# Patient Record
Sex: FEMALE | Race: WHITE
[De-identification: names, ages, dates, MRNs, and addresses within clinical notes are randomized per-mention and may not be internally consistent; named-entity substitution may affect disease eponyms.]

---

## 2018-10-08 ENCOUNTER — HOSPITAL ENCOUNTER (EMERGENCY)
Dept: HOSPITAL 83 - ED | Age: 38
Discharge: HOME | End: 2018-10-08
Payer: COMMERCIAL

## 2018-10-08 VITALS — DIASTOLIC BLOOD PRESSURE: 65 MMHG

## 2018-10-08 VITALS — WEIGHT: 224 LBS | HEIGHT: 67.99 IN | BODY MASS INDEX: 33.95 KG/M2

## 2018-10-08 DIAGNOSIS — Z88.8: ICD-10-CM

## 2018-10-08 DIAGNOSIS — Z79.899: ICD-10-CM

## 2018-10-08 DIAGNOSIS — M25.572: ICD-10-CM

## 2018-10-08 DIAGNOSIS — Z88.1: ICD-10-CM

## 2018-10-08 DIAGNOSIS — M79.662: ICD-10-CM

## 2018-10-08 DIAGNOSIS — Z88.2: ICD-10-CM

## 2018-10-08 DIAGNOSIS — Z88.6: ICD-10-CM

## 2018-10-08 DIAGNOSIS — R60.0: Primary | ICD-10-CM

## 2018-10-08 DIAGNOSIS — Z79.82: ICD-10-CM

## 2018-10-08 DIAGNOSIS — F17.200: ICD-10-CM

## 2018-10-08 DIAGNOSIS — Z79.84: ICD-10-CM

## 2018-10-08 DIAGNOSIS — Z98.51: ICD-10-CM

## 2018-12-14 ENCOUNTER — HOSPITAL ENCOUNTER (EMERGENCY)
Dept: HOSPITAL 83 - ED | Age: 38
Discharge: HOME | End: 2018-12-14
Payer: COMMERCIAL

## 2018-12-14 VITALS — WEIGHT: 218 LBS | BODY MASS INDEX: 33.04 KG/M2 | HEIGHT: 67.99 IN

## 2018-12-14 VITALS — DIASTOLIC BLOOD PRESSURE: 54 MMHG

## 2018-12-14 DIAGNOSIS — Z88.2: ICD-10-CM

## 2018-12-14 DIAGNOSIS — Z88.6: ICD-10-CM

## 2018-12-14 DIAGNOSIS — E27.8: Primary | ICD-10-CM

## 2018-12-14 DIAGNOSIS — G43.909: ICD-10-CM

## 2018-12-14 DIAGNOSIS — Z90.710: ICD-10-CM

## 2018-12-14 DIAGNOSIS — Z79.899: ICD-10-CM

## 2018-12-14 DIAGNOSIS — Z79.84: ICD-10-CM

## 2018-12-14 DIAGNOSIS — Z88.1: ICD-10-CM

## 2018-12-14 DIAGNOSIS — J44.9: ICD-10-CM

## 2018-12-14 DIAGNOSIS — R03.0: ICD-10-CM

## 2018-12-14 LAB
ALBUMIN SERPL-MCNC: 3.5 GM/DL (ref 3.1–4.5)
ALP SERPL-CCNC: 92 U/L (ref 45–117)
ALT SERPL W P-5'-P-CCNC: 20 U/L (ref 12–78)
APPEARANCE UR: (no result)
AST SERPL-CCNC: 21 IU/L (ref 3–35)
BACTERIA #/AREA URNS HPF: (no result) /[HPF]
BASOPHILS # BLD AUTO: 0.1 10*3/UL (ref 0–0.1)
BASOPHILS NFR BLD AUTO: 0.7 % (ref 0–1)
BILIRUB UR QL STRIP: NEGATIVE
BUN SERPL-MCNC: 11 MG/DL (ref 7–24)
CHLORIDE SERPL-SCNC: 102 MMOL/L (ref 98–107)
COLOR UR: YELLOW
CREAT SERPL-MCNC: 1.14 MG/DL (ref 0.55–1.02)
EOSINOPHIL # BLD AUTO: 0.3 10*3/UL (ref 0–0.4)
EOSINOPHIL # BLD AUTO: 2.4 % (ref 1–4)
EPI CELLS #/AREA URNS HPF: (no result) /[HPF]
ERYTHROCYTE [DISTWIDTH] IN BLOOD BY AUTOMATED COUNT: 14.1 % (ref 0–14.5)
GLUCOSE UR QL: NEGATIVE
HCT VFR BLD AUTO: 46.4 % (ref 37–47)
HGB BLD-MCNC: 15.2 G/DL (ref 12–16)
HGB UR QL STRIP: NEGATIVE
KETONES UR QL STRIP: NEGATIVE
LEUKOCYTE ESTERASE UR QL STRIP: (no result)
LIPASE SERPL-CCNC: 58 U/L (ref 73–393)
LYMPHOCYTES # BLD AUTO: 4.1 10*3/UL (ref 1.3–4.4)
LYMPHOCYTES NFR BLD AUTO: 33.7 % (ref 27–41)
MCH RBC QN AUTO: 29 PG (ref 27–31)
MCHC RBC AUTO-ENTMCNC: 32.8 G/DL (ref 33–37)
MCV RBC AUTO: 88.5 FL (ref 81–99)
MONOCYTES # BLD AUTO: 0.7 10*3/UL (ref 0.1–1)
MONOCYTES NFR BLD MANUAL: 5.7 % (ref 3–9)
MUCOUS THREADS URNS QL MICRO: (no result)
NEUT #: 7 10*3/UL (ref 2.3–7.9)
NEUT %: 57.3 % (ref 47–73)
NITRITE UR QL STRIP: NEGATIVE
NRBC BLD QL AUTO: 0 10*3/UL (ref 0–0)
PH UR STRIP: 6 [PH] (ref 5–9)
PLATELET # BLD AUTO: 353 10*3/UL (ref 130–400)
PMV BLD AUTO: 8.9 FL (ref 9.6–12.3)
POTASSIUM SERPL-SCNC: 4.1 MMOL/L (ref 3.5–5.1)
PROT SERPL-MCNC: 8.3 GM/DL (ref 6.4–8.2)
RBC # BLD AUTO: 5.24 10*6/UL (ref 4.1–5.1)
RBC #/AREA URNS HPF: (no result) RBC/HPF (ref 0–2)
SODIUM SERPL-SCNC: 139 MMOL/L (ref 136–145)
SP GR UR: 1.01 (ref 1–1.03)
UROBILINOGEN UR STRIP-MCNC: 0.2 E.U./DL (ref 0.2–1)
WBC #/AREA URNS HPF: (no result) WBC/HPF (ref 0–5)
WBC NRBC COR # BLD AUTO: 12.3 10*3/UL (ref 4.8–10.8)

## 2019-01-28 ENCOUNTER — HOSPITAL ENCOUNTER (EMERGENCY)
Dept: HOSPITAL 83 - ED | Age: 39
Discharge: HOME | End: 2019-01-28
Payer: COMMERCIAL

## 2019-01-28 VITALS — WEIGHT: 240 LBS | BODY MASS INDEX: 36.37 KG/M2 | HEIGHT: 67.99 IN

## 2019-01-28 VITALS — DIASTOLIC BLOOD PRESSURE: 73 MMHG

## 2019-01-28 DIAGNOSIS — R19.7: ICD-10-CM

## 2019-01-28 DIAGNOSIS — J44.1: Primary | ICD-10-CM

## 2019-01-28 DIAGNOSIS — Z79.899: ICD-10-CM

## 2019-01-28 DIAGNOSIS — Z88.2: ICD-10-CM

## 2019-01-28 DIAGNOSIS — G43.909: ICD-10-CM

## 2019-01-28 DIAGNOSIS — Z88.6: ICD-10-CM

## 2019-01-28 DIAGNOSIS — Z88.8: ICD-10-CM

## 2019-02-08 ENCOUNTER — HOSPITAL ENCOUNTER (EMERGENCY)
Dept: HOSPITAL 83 - ED | Age: 39
Discharge: ANOTHER HOSPITAL | End: 2019-02-08
Payer: COMMERCIAL

## 2019-02-08 VITALS — SYSTOLIC BLOOD PRESSURE: 144 MMHG | DIASTOLIC BLOOD PRESSURE: 57 MMHG

## 2019-02-08 DIAGNOSIS — Z79.84: ICD-10-CM

## 2019-02-08 DIAGNOSIS — G45.9: Primary | ICD-10-CM

## 2019-02-08 DIAGNOSIS — Z88.8: ICD-10-CM

## 2019-02-08 DIAGNOSIS — J44.9: ICD-10-CM

## 2019-02-08 DIAGNOSIS — G43.909: ICD-10-CM

## 2019-02-08 DIAGNOSIS — Z79.2: ICD-10-CM

## 2019-02-08 DIAGNOSIS — Z88.6: ICD-10-CM

## 2019-02-08 DIAGNOSIS — Z79.899: ICD-10-CM

## 2019-02-08 DIAGNOSIS — Z88.2: ICD-10-CM

## 2019-02-08 DIAGNOSIS — Z88.1: ICD-10-CM

## 2019-02-08 DIAGNOSIS — F17.200: ICD-10-CM

## 2019-02-08 LAB
ALBUMIN SERPL-MCNC: 3.3 GM/DL (ref 3.1–4.5)
ALP SERPL-CCNC: 83 U/L (ref 45–117)
ALT SERPL W P-5'-P-CCNC: 22 U/L (ref 12–78)
APPEARANCE UR: CLEAR
APTT PPP: 26.8 SECONDS (ref 20.8–31.5)
AST SERPL-CCNC: 12 IU/L (ref 3–35)
BACTERIA #/AREA URNS HPF: (no result) /[HPF]
BASOPHILS # BLD AUTO: 0.1 10*3/UL (ref 0–0.1)
BASOPHILS NFR BLD AUTO: 0.5 % (ref 0–1)
BILIRUB UR QL STRIP: NEGATIVE
BUN SERPL-MCNC: 10 MG/DL (ref 7–24)
CHLORIDE SERPL-SCNC: 103 MMOL/L (ref 98–107)
COLOR UR: YELLOW
CREAT SERPL-MCNC: 0.93 MG/DL (ref 0.55–1.02)
EOSINOPHIL # BLD AUTO: 0.1 10*3/UL (ref 0–0.4)
EOSINOPHIL # BLD AUTO: 1.2 % (ref 1–4)
ERYTHROCYTE [DISTWIDTH] IN BLOOD BY AUTOMATED COUNT: 14.4 % (ref 0–14.5)
GLUCOSE UR QL: NEGATIVE
HCT VFR BLD AUTO: 47.5 % (ref 37–47)
HGB BLD-MCNC: 14.9 G/DL (ref 12–16)
HGB UR QL STRIP: NEGATIVE
INR BLD: 1 (ref 2–3.5)
KETONES UR QL STRIP: NEGATIVE
LEUKOCYTE ESTERASE UR QL STRIP: NEGATIVE
LYMPHOCYTES # BLD AUTO: 2.8 10*3/UL (ref 1.3–4.4)
LYMPHOCYTES NFR BLD AUTO: 25.5 % (ref 27–41)
MCH RBC QN AUTO: 28.5 PG (ref 27–31)
MCHC RBC AUTO-ENTMCNC: 31.4 G/DL (ref 33–37)
MCV RBC AUTO: 90.8 FL (ref 81–99)
MONOCYTES # BLD AUTO: 0.7 10*3/UL (ref 0.1–1)
MONOCYTES NFR BLD MANUAL: 6.4 % (ref 3–9)
NEUT #: 7.2 10*3/UL (ref 2.3–7.9)
NEUT %: 65.9 % (ref 47–73)
NITRITE UR QL STRIP: NEGATIVE
NRBC BLD QL AUTO: 0 10*3/UL (ref 0–0)
PH UR STRIP: 6 [PH] (ref 5–9)
PLATELET # BLD AUTO: 281 10*3/UL (ref 130–400)
PMV BLD AUTO: 8.8 FL (ref 9.6–12.3)
POTASSIUM SERPL-SCNC: 3.6 MMOL/L (ref 3.5–5.1)
PROT SERPL-MCNC: 7.3 GM/DL (ref 6.4–8.2)
RBC # BLD AUTO: 5.23 10*6/UL (ref 4.1–5.1)
SODIUM SERPL-SCNC: 141 MMOL/L (ref 136–145)
SP GR UR: 1.01 (ref 1–1.03)
UROBILINOGEN UR STRIP-MCNC: 0.2 E.U./DL (ref 0.2–1)
WBC #/AREA URNS HPF: (no result) WBC/HPF (ref 0–5)
WBC NRBC COR # BLD AUTO: 11 10*3/UL (ref 4.8–10.8)

## 2020-09-13 ENCOUNTER — HOSPITAL ENCOUNTER (EMERGENCY)
Dept: HOSPITAL 83 - ED | Age: 40
Discharge: HOME | End: 2020-09-13
Payer: SELF-PAY

## 2020-09-13 VITALS — DIASTOLIC BLOOD PRESSURE: 62 MMHG

## 2020-09-13 VITALS — WEIGHT: 270 LBS | BODY MASS INDEX: 40.92 KG/M2 | HEIGHT: 67.99 IN

## 2020-09-13 DIAGNOSIS — J44.9: ICD-10-CM

## 2020-09-13 DIAGNOSIS — Z79.899: ICD-10-CM

## 2020-09-13 DIAGNOSIS — I10: ICD-10-CM

## 2020-09-13 DIAGNOSIS — Z88.8: ICD-10-CM

## 2020-09-13 DIAGNOSIS — E11.9: ICD-10-CM

## 2020-09-13 DIAGNOSIS — Z88.2: ICD-10-CM

## 2020-09-13 DIAGNOSIS — R60.0: Primary | ICD-10-CM

## 2020-09-13 DIAGNOSIS — F17.200: ICD-10-CM

## 2020-09-13 LAB
ALBUMIN SERPL-MCNC: 3.2 GM/DL (ref 3.1–4.5)
ALP SERPL-CCNC: 113 U/L (ref 45–117)
ALT SERPL W P-5'-P-CCNC: 30 U/L (ref 12–78)
APTT PPP: 33 SECONDS (ref 20–32.1)
AST SERPL-CCNC: 17 IU/L (ref 3–35)
BASOPHILS # BLD AUTO: 0.1 10*3/UL (ref 0–0.1)
BASOPHILS NFR BLD AUTO: 0.6 % (ref 0–1)
BUN SERPL-MCNC: 11 MG/DL (ref 7–24)
CHLORIDE SERPL-SCNC: 105 MMOL/L (ref 98–107)
CREAT SERPL-MCNC: 1.01 MG/DL (ref 0.55–1.02)
EOSINOPHIL # BLD AUTO: 0.3 10*3/UL (ref 0–0.4)
EOSINOPHIL # BLD AUTO: 2.5 % (ref 1–4)
ERYTHROCYTE [DISTWIDTH] IN BLOOD BY AUTOMATED COUNT: 14.5 % (ref 0–14.5)
HCT VFR BLD AUTO: 47.2 % (ref 37–47)
INR BLD: 1.1 (ref 2–3.5)
LYMPHOCYTES # BLD AUTO: 3.2 10*3/UL (ref 1.3–4.4)
LYMPHOCYTES NFR BLD AUTO: 31.2 % (ref 27–41)
MCH RBC QN AUTO: 28.6 PG (ref 27–31)
MCHC RBC AUTO-ENTMCNC: 31.8 G/DL (ref 33–37)
MCV RBC AUTO: 90.1 FL (ref 81–99)
MONOCYTES # BLD AUTO: 0.7 10*3/UL (ref 0.1–1)
MONOCYTES NFR BLD MANUAL: 6.3 % (ref 3–9)
NEUT #: 6.1 10*3/UL (ref 2.3–7.9)
NEUT %: 59 % (ref 47–73)
NRBC BLD QL AUTO: 0 10*3/UL (ref 0–0)
PLATELET # BLD AUTO: 303 10*3/UL (ref 130–400)
PMV BLD AUTO: 8.8 FL (ref 9.6–12.3)
POTASSIUM SERPL-SCNC: 3.8 MMOL/L (ref 3.5–5.1)
PROT SERPL-MCNC: 7.6 GM/DL (ref 6.4–8.2)
RBC # BLD AUTO: 5.24 10*6/UL (ref 4.1–5.1)
SODIUM SERPL-SCNC: 141 MMOL/L (ref 136–145)
TROPONIN I SERPL-MCNC: < 0.015 NG/ML (ref ?–0.04)
WBC NRBC COR # BLD AUTO: 10.4 10*3/UL (ref 4.8–10.8)

## 2020-09-23 ENCOUNTER — HOSPITAL ENCOUNTER (INPATIENT)
Dept: HOSPITAL 83 - ED | Age: 40
LOS: 6 days | Discharge: HOME | DRG: 871 | End: 2020-09-29
Attending: INTERNAL MEDICINE | Admitting: INTERNAL MEDICINE
Payer: COMMERCIAL

## 2020-09-23 VITALS — DIASTOLIC BLOOD PRESSURE: 76 MMHG

## 2020-09-23 VITALS — DIASTOLIC BLOOD PRESSURE: 88 MMHG

## 2020-09-23 VITALS — DIASTOLIC BLOOD PRESSURE: 69 MMHG | SYSTOLIC BLOOD PRESSURE: 130 MMHG

## 2020-09-23 VITALS — WEIGHT: 272.13 LBS | HEIGHT: 67.99 IN | BODY MASS INDEX: 41.24 KG/M2

## 2020-09-23 VITALS — SYSTOLIC BLOOD PRESSURE: 138 MMHG | DIASTOLIC BLOOD PRESSURE: 80 MMHG

## 2020-09-23 VITALS — DIASTOLIC BLOOD PRESSURE: 56 MMHG | SYSTOLIC BLOOD PRESSURE: 119 MMHG

## 2020-09-23 VITALS — DIASTOLIC BLOOD PRESSURE: 110 MMHG | SYSTOLIC BLOOD PRESSURE: 147 MMHG

## 2020-09-23 DIAGNOSIS — J44.1: ICD-10-CM

## 2020-09-23 DIAGNOSIS — E78.5: ICD-10-CM

## 2020-09-23 DIAGNOSIS — Z20.828: ICD-10-CM

## 2020-09-23 DIAGNOSIS — F17.210: ICD-10-CM

## 2020-09-23 DIAGNOSIS — Z86.73: ICD-10-CM

## 2020-09-23 DIAGNOSIS — Z83.79: ICD-10-CM

## 2020-09-23 DIAGNOSIS — Z79.899: ICD-10-CM

## 2020-09-23 DIAGNOSIS — Z71.6: ICD-10-CM

## 2020-09-23 DIAGNOSIS — J44.0: ICD-10-CM

## 2020-09-23 DIAGNOSIS — Z86.718: ICD-10-CM

## 2020-09-23 DIAGNOSIS — F32.9: ICD-10-CM

## 2020-09-23 DIAGNOSIS — D68.9: ICD-10-CM

## 2020-09-23 DIAGNOSIS — E66.01: ICD-10-CM

## 2020-09-23 DIAGNOSIS — A41.9: Primary | ICD-10-CM

## 2020-09-23 DIAGNOSIS — J96.02: ICD-10-CM

## 2020-09-23 DIAGNOSIS — E87.1: ICD-10-CM

## 2020-09-23 DIAGNOSIS — R65.20: ICD-10-CM

## 2020-09-23 DIAGNOSIS — R73.03: ICD-10-CM

## 2020-09-23 DIAGNOSIS — Z88.2: ICD-10-CM

## 2020-09-23 DIAGNOSIS — D68.62: ICD-10-CM

## 2020-09-23 DIAGNOSIS — J18.9: ICD-10-CM

## 2020-09-23 DIAGNOSIS — J96.01: ICD-10-CM

## 2020-09-23 DIAGNOSIS — R73.9: ICD-10-CM

## 2020-09-23 DIAGNOSIS — Z90.710: ICD-10-CM

## 2020-09-23 DIAGNOSIS — E87.8: ICD-10-CM

## 2020-09-23 DIAGNOSIS — Z88.8: ICD-10-CM

## 2020-09-23 LAB
ALBUMIN SERPL-MCNC: 3.3 GM/DL (ref 3.1–4.5)
ALP SERPL-CCNC: 108 U/L (ref 45–117)
ALT SERPL W P-5'-P-CCNC: 21 U/L (ref 12–78)
AST SERPL-CCNC: 12 IU/L (ref 3–35)
BASE EXCESS BLDA CALC-SCNC: 5.5 MMOL/L (ref -2–2)
BASE EXCESS BLDA CALC-SCNC: 6.7 MMOL/L (ref -2–2)
BASOPHILS # BLD AUTO: 0.1 10*3/UL (ref 0–0.1)
BASOPHILS NFR BLD AUTO: 0.4 % (ref 0–1)
BUN SERPL-MCNC: 13 MG/DL (ref 7–24)
CHLORIDE SERPL-SCNC: 97 MMOL/L (ref 98–107)
CREAT SERPL-MCNC: 0.82 MG/DL (ref 0.55–1.02)
EOSINOPHIL # BLD AUTO: 0.1 10*3/UL (ref 0–0.4)
EOSINOPHIL # BLD AUTO: 0.5 % (ref 1–4)
ERYTHROCYTE [DISTWIDTH] IN BLOOD BY AUTOMATED COUNT: 15.1 % (ref 0–14.5)
HCO3 BLDA-SCNC: 32 MMOL/L (ref 22–26)
HCO3 BLDA-SCNC: 32 MMOL/L (ref 22–26)
HCT VFR BLD AUTO: 46.6 % (ref 37–47)
INR BLD: 1.1 (ref 2–3.5)
LYMPHOCYTES # BLD AUTO: 2.5 10*3/UL (ref 1.3–4.4)
LYMPHOCYTES NFR BLD AUTO: 19.5 % (ref 27–41)
MCH RBC QN AUTO: 28.2 PG (ref 27–31)
MCHC RBC AUTO-ENTMCNC: 31.5 G/DL (ref 33–37)
MCV RBC AUTO: 89.3 FL (ref 81–99)
MONOCYTES # BLD AUTO: 1 10*3/UL (ref 0.1–1)
MONOCYTES NFR BLD MANUAL: 7.9 % (ref 3–9)
NEUT #: 9 10*3/UL (ref 2.3–7.9)
NEUT %: 71 % (ref 47–73)
NRBC BLD QL AUTO: 0 10*3/UL (ref 0–0)
PCO2 BLDA: 51 MMHG (ref 35–45)
PCO2 BLDA: 54 MMHG (ref 35–45)
PH BLDA: 7.39 [PH] (ref 7.35–7.45)
PH BLDA: 7.42 [PH] (ref 7.35–7.45)
PLATELET # BLD AUTO: 283 10*3/UL (ref 130–400)
PMV BLD AUTO: 8.8 FL (ref 9.6–12.3)
PO2 BLDA: 65 MMHG (ref 80–90)
PO2 BLDA: 75 MMHG (ref 80–90)
POTASSIUM SERPL-SCNC: 3.8 MMOL/L (ref 3.5–5.1)
PROT SERPL-MCNC: 7.8 GM/DL (ref 6.4–8.2)
RBC # BLD AUTO: 5.22 10*6/UL (ref 4.1–5.1)
SAO2 % BLDA: 93 % (ref 95–97)
SAO2 % BLDA: 95 % (ref 95–97)
SODIUM SERPL-SCNC: 133 MMOL/L (ref 136–145)
TROPONIN I SERPL-MCNC: < 0.015 NG/ML (ref ?–0.04)
WBC NRBC COR # BLD AUTO: 12.6 10*3/UL (ref 4.8–10.8)

## 2020-09-23 NOTE — NUR
PT TO ICCU AT THIS TIME VIA STRETCHER.RESPIRATORY CALLED FOR TRANSPORT OF BIPAP
MACHINE.IV FLUIDS OF ZITHROMAX CONTINUE WITH TRANSFER.

## 2020-09-23 NOTE — NUR
NURSE TO NURSE REPORT GIVEN TO THIS RN.PT AWAITING ADMISSION TO
ICU-2.SUPERVISOR ADVISES PT IS UNABLE TO BE TRANSFERRED TO UNIT AT THIS TIME.DENISE WILSON AWARE OF DECREASE IN PT O2 SAT TO 83% ON 15L CONTINUOS O2.PT TO BE
ORDERED BIPAP.PT MOVED TO ROOM ER3 AT THIS TIME TO AWAIT ADMISSION.

## 2020-09-23 NOTE — NUR
A 40, admitted to ICCU, under the
services of WILFREDO Cabello DO with a diagnosis of SUSPECTED COVID.
Chief complaint is SHORTNESS OF BREATH, COUGH.
Patient arrived via bed from ER.
Monitor applied. Initial assessment completed.
Vital signs taken and recorded.
WILFREDO CABELLO DO notified of admission to the unit.
Orders received.
See assessment for past medical history, medications
and allergies.
Patient and/or family oriented to unit. [facility] ICCU
visitation policy reviewed.
Clothing/patient valuable form completed.
 
LIANA GONZALEZ

## 2020-09-23 NOTE — NUR
PLACED PATIENT ON BIPAP 12/6, 60%. SPO2 95%, HR 86, , F18, LEAK 5.
TOLERATING WELL. ALARMS ON AND AUDIBLE

## 2020-09-23 NOTE — NUR
Patient now agreeable to intubation, if needed. Patient also stated she would
try to wear the bipap.

## 2020-09-23 NOTE — NUR
Pt placed on 15L HFNC - SPO2 92% - Pt is complaining of the BiPap. Will
continue to monitor. Nurse aware.

## 2020-09-23 NOTE — NUR
Patient informed me she didnt want to wear the bipap and for me to tell Dr. Marshall that when I spoke to him, I explained the consequences to her and that
she might require intubation if getting worse. She stated she did not want to
get the tube. I spoke with Dr. Marshall and made him aware of patients wishes.

## 2020-09-24 VITALS — DIASTOLIC BLOOD PRESSURE: 53 MMHG

## 2020-09-24 VITALS — SYSTOLIC BLOOD PRESSURE: 127 MMHG | DIASTOLIC BLOOD PRESSURE: 55 MMHG

## 2020-09-24 VITALS — SYSTOLIC BLOOD PRESSURE: 113 MMHG | DIASTOLIC BLOOD PRESSURE: 56 MMHG

## 2020-09-24 VITALS — SYSTOLIC BLOOD PRESSURE: 104 MMHG | DIASTOLIC BLOOD PRESSURE: 67 MMHG

## 2020-09-24 VITALS — SYSTOLIC BLOOD PRESSURE: 130 MMHG | DIASTOLIC BLOOD PRESSURE: 56 MMHG

## 2020-09-24 VITALS — DIASTOLIC BLOOD PRESSURE: 60 MMHG

## 2020-09-24 VITALS — SYSTOLIC BLOOD PRESSURE: 108 MMHG | DIASTOLIC BLOOD PRESSURE: 60 MMHG

## 2020-09-24 LAB
25(OH)D3 SERPL-MCNC: 20.5 NG/ML (ref 30–100)
ALBUMIN SERPL-MCNC: 2.9 GM/DL (ref 3.1–4.5)
ALP SERPL-CCNC: 104 U/L (ref 45–117)
ALT SERPL W P-5'-P-CCNC: 23 U/L (ref 12–78)
APPEARANCE UR: CLEAR
APTT PPP: 31.8 SECONDS (ref 20–32.1)
AST SERPL-CCNC: 10 IU/L (ref 3–35)
BACTERIA #/AREA URNS HPF: (no result) /[HPF]
BASE EXCESS BLDA CALC-SCNC: 5.6 MMOL/L (ref -2–2)
BASE EXCESS BLDA CALC-SCNC: 7 MMOL/L (ref -2–2)
BASOPHILS # BLD AUTO: 0 10*3/UL (ref 0–0.1)
BASOPHILS NFR BLD AUTO: 0.4 % (ref 0–1)
BILIRUB UR QL STRIP: NEGATIVE
BUN SERPL-MCNC: 15 MG/DL (ref 7–24)
CHLORIDE SERPL-SCNC: 102 MMOL/L (ref 98–107)
CHOLEST SERPL-MCNC: 152 MG/DL (ref ?–200)
CK SERPL-CCNC: 124 U/L (ref 26–192)
CREAT SERPL-MCNC: 0.74 MG/DL (ref 0.55–1.02)
EOSINOPHIL # BLD AUTO: 0 % (ref 1–4)
EOSINOPHIL # BLD AUTO: 0 10*3/UL (ref 0–0.4)
EPI CELLS #/AREA URNS HPF: (no result) /[HPF]
ERYTHROCYTE [DISTWIDTH] IN BLOOD BY AUTOMATED COUNT: 14.8 % (ref 0–14.5)
FERRITIN SERPL-MCNC: 90 NG/ML (ref 10–291)
GLUCOSE UR QL: (no result)
HCO3 BLDA-SCNC: 33 MMOL/L (ref 22–26)
HCO3 BLDA-SCNC: 33 MMOL/L (ref 22–26)
HCT VFR BLD AUTO: 46.6 % (ref 37–47)
HDLC SERPL-MCNC: 24 MG/DL (ref 40–60)
HGB UR QL STRIP: NEGATIVE
INR BLD: 1.1 (ref 2–3.5)
KETONES UR QL STRIP: NEGATIVE
LDLC SERPL DIRECT ASSAY-MCNC: 101 MG/DL (ref 9–159)
LEUKOCYTE ESTERASE UR QL STRIP: NEGATIVE
LYMPHOCYTES # BLD AUTO: 1.2 10*3/UL (ref 1.3–4.4)
LYMPHOCYTES NFR BLD AUTO: 16.7 % (ref 27–41)
MCH RBC QN AUTO: 28.4 PG (ref 27–31)
MCHC RBC AUTO-ENTMCNC: 31.5 G/DL (ref 33–37)
MCV RBC AUTO: 90 FL (ref 81–99)
MONOCYTES # BLD AUTO: 0.2 10*3/UL (ref 0.1–1)
MONOCYTES NFR BLD MANUAL: 3.1 % (ref 3–9)
NEUT #: 5.8 10*3/UL (ref 2.3–7.9)
NEUT %: 78.7 % (ref 47–73)
NITRITE UR QL STRIP: NEGATIVE
NRBC BLD QL AUTO: 0 10*3/UL (ref 0–0)
PCO2 BLDA: 52 MMHG (ref 35–45)
PCO2 BLDA: 60 MMHG (ref 35–45)
PH BLDA: 7.35 [PH] (ref 7.35–7.45)
PH BLDA: 7.42 [PH] (ref 7.35–7.45)
PH UR STRIP: 6.5 [PH] (ref 4.5–8)
PLATELET # BLD AUTO: 267 10*3/UL (ref 130–400)
PMV BLD AUTO: 9.1 FL (ref 9.6–12.3)
PO2 BLDA: 71 MMHG (ref 80–90)
PO2 BLDA: 92 MMHG (ref 80–90)
POTASSIUM SERPL-SCNC: 4.4 MMOL/L (ref 3.5–5.1)
PROT SERPL-MCNC: 7.6 GM/DL (ref 6.4–8.2)
RBC # BLD AUTO: 5.18 10*6/UL (ref 4.1–5.1)
RBC #/AREA URNS HPF: (no result) RBC/HPF (ref 0–2)
SAO2 % BLDA: 95 % (ref 95–97)
SAO2 % BLDA: 98 % (ref 95–97)
SODIUM SERPL-SCNC: 136 MMOL/L (ref 136–145)
SP GR UR: 1.02 (ref 1–1.03)
T4 FREE SERPL-MCNC: 0.98 NG/DL (ref 0.76–1.46)
TRIGL SERPL-MCNC: 137 MG/DL (ref ?–150)
TSH SERPL DL<=0.005 MIU/L-ACNC: 0.34 UIU/ML (ref 0.36–4.75)
UROBILINOGEN UR STRIP-MCNC: 0.2 E.U./DL (ref 0–1)
VITAMIN B12: 387 PG/ML (ref 247–911)
VLDLC SERPL CALC-MCNC: 27 MG/DL (ref 6–40)
WBC NRBC COR # BLD AUTO: 7.4 10*3/UL (ref 4.8–10.8)
YEAST #/AREA URNS HPF: (no result) /[HPF]

## 2020-09-24 NOTE — NUR
[id number]  aWAKE AND ALERT. nO C/O. iNDEPENDENT . Dr. Marshall in to [facility]. ABG
were drawn and resulted to him. New orders were recieved. Breakfast was
given and taken well. UA and sputum specimens were obtained and sent.
Dr. Covington is rounding at this time. Resident for Dr. Obando was in.

## 2020-09-24 NOTE — NUR
Full assessment . Partial bath offered, Requested a wash cloth , given ,
stated she washed up enough. Supper ordered. And taken well.

## 2020-09-25 VITALS — DIASTOLIC BLOOD PRESSURE: 39 MMHG

## 2020-09-25 VITALS — DIASTOLIC BLOOD PRESSURE: 71 MMHG | SYSTOLIC BLOOD PRESSURE: 137 MMHG

## 2020-09-25 VITALS — SYSTOLIC BLOOD PRESSURE: 132 MMHG | DIASTOLIC BLOOD PRESSURE: 42 MMHG

## 2020-09-25 VITALS — DIASTOLIC BLOOD PRESSURE: 69 MMHG | SYSTOLIC BLOOD PRESSURE: 121 MMHG

## 2020-09-25 VITALS — DIASTOLIC BLOOD PRESSURE: 47 MMHG

## 2020-09-25 VITALS — DIASTOLIC BLOOD PRESSURE: 56 MMHG

## 2020-09-25 LAB
ALBUMIN SERPL-MCNC: 2.9 GM/DL (ref 3.1–4.5)
ALP SERPL-CCNC: 93 U/L (ref 45–117)
ALT SERPL W P-5'-P-CCNC: 20 U/L (ref 12–78)
AST SERPL-CCNC: 3 IU/L (ref 3–35)
BASE EXCESS BLDA CALC-SCNC: 8.1 MMOL/L (ref -2–2)
BASOPHILS # BLD AUTO: 0 10*3/UL (ref 0–0.1)
BASOPHILS NFR BLD AUTO: 0.2 % (ref 0–1)
BUN SERPL-MCNC: 18 MG/DL (ref 7–24)
CHLORIDE SERPL-SCNC: 100 MMOL/L (ref 98–107)
CK SERPL-CCNC: 59 U/L (ref 26–192)
CREAT SERPL-MCNC: 0.87 MG/DL (ref 0.55–1.02)
EOSINOPHIL # BLD AUTO: 0 10*3/UL (ref 0–0.4)
EOSINOPHIL # BLD AUTO: 0.1 % (ref 1–4)
ERYTHROCYTE [DISTWIDTH] IN BLOOD BY AUTOMATED COUNT: 14.8 % (ref 0–14.5)
HCO3 BLDA-SCNC: 35 MMOL/L (ref 22–26)
HCT VFR BLD AUTO: 45.6 % (ref 37–47)
LDH SERPL-CCNC: 140 U/L (ref 84–246)
LYMPHOCYTES # BLD AUTO: 3 10*3/UL (ref 1.3–4.4)
LYMPHOCYTES NFR BLD AUTO: 24.5 % (ref 27–41)
MCH RBC QN AUTO: 28.9 PG (ref 27–31)
MCHC RBC AUTO-ENTMCNC: 31.4 G/DL (ref 33–37)
MCV RBC AUTO: 92.1 FL (ref 81–99)
MONOCYTES # BLD AUTO: 0.6 10*3/UL (ref 0.1–1)
MONOCYTES NFR BLD MANUAL: 5.1 % (ref 3–9)
NEUT #: 8.4 10*3/UL (ref 2.3–7.9)
NEUT %: 69.6 % (ref 47–73)
NRBC BLD QL AUTO: 0 10*3/UL (ref 0–0)
PCO2 BLDA: 61 MMHG (ref 35–45)
PH BLDA: 7.38 [PH] (ref 7.35–7.45)
PLATELET # BLD AUTO: 266 10*3/UL (ref 130–400)
PMV BLD AUTO: 9.1 FL (ref 9.6–12.3)
PO2 BLDA: 64 MMHG (ref 80–90)
POTASSIUM SERPL-SCNC: 3.9 MMOL/L (ref 3.5–5.1)
PROT SERPL-MCNC: 7.2 GM/DL (ref 6.4–8.2)
RBC # BLD AUTO: 4.95 10*6/UL (ref 4.1–5.1)
SAO2 % BLDA: 93 % (ref 95–97)
SODIUM SERPL-SCNC: 137 MMOL/L (ref 136–145)
WBC NRBC COR # BLD AUTO: 12 10*3/UL (ref 4.8–10.8)

## 2020-09-25 NOTE — NUR
2156 EARLIER ZOFRAN EFFECTIVE. RESTING IN BED WITH EYES CLOSED. APPEARS TO BE
SLEEPING. PULSE OX 97%

## 2020-09-25 NOTE — NUR
Patient's [facility]  Jasmine from [facility] stated she can be contacted for
any discharge needs the patient may have.
Jasmine [phone]

## 2020-09-25 NOTE — NUR
PT AAOX3.  VSS.  RESP EASY.  LUNG ESCOBAR DIM. BILATERALLY.  POX 89-93% ON 12L
HIGH-DORETHA NC.  ABD. SOFT WITH ACTIVE BOWEL SOUNDS.  NO PERIPHERAL EDEMA.  PT
DOES C/O A SORE NOSE.  SHE STATES IT MAY BE JUST DRY.  WILL NOTIFY DOCTOR.

## 2020-09-25 NOTE — NUR
2000 UP TO BR. HAD BM. BATH TAKEN AT SINK PER SELF. LINENS CHANGED. TOLERATED
WELL. 02 INTACT VIA HI DORETHA NC. PULSE OX 95%. HEP LOCK INTACT. NO C/O'S VOICED
AT PRESENT.
2020 BACK TO BED. HOB ELEVATED. CALL LIGHT IN REACH. BIPAP APPLIED. PULSE OX
95%.

## 2020-09-25 NOTE — NUR
pt placed on bipap. spo2 95% ,hr 61, rr 14. pt resting easy. instructed pt she
needs to stay on machine for 2 hrs per dr covington

## 2020-09-26 VITALS — SYSTOLIC BLOOD PRESSURE: 139 MMHG | DIASTOLIC BLOOD PRESSURE: 64 MMHG

## 2020-09-26 VITALS — DIASTOLIC BLOOD PRESSURE: 55 MMHG | SYSTOLIC BLOOD PRESSURE: 133 MMHG

## 2020-09-26 VITALS — SYSTOLIC BLOOD PRESSURE: 110 MMHG | DIASTOLIC BLOOD PRESSURE: 56 MMHG

## 2020-09-26 VITALS — DIASTOLIC BLOOD PRESSURE: 57 MMHG

## 2020-09-26 VITALS — DIASTOLIC BLOOD PRESSURE: 69 MMHG

## 2020-09-26 VITALS — DIASTOLIC BLOOD PRESSURE: 80 MMHG | SYSTOLIC BLOOD PRESSURE: 138 MMHG

## 2020-09-26 LAB
ALBUMIN SERPL-MCNC: 3 GM/DL (ref 3.1–4.5)
ALP SERPL-CCNC: 86 U/L (ref 45–117)
ALT SERPL W P-5'-P-CCNC: 42 U/L (ref 12–78)
AST SERPL-CCNC: 16 IU/L (ref 3–35)
BASE EXCESS BLDA CALC-SCNC: 6.1 MMOL/L (ref -2–2)
BASOPHILS # BLD AUTO: 0 10*3/UL (ref 0–0.1)
BASOPHILS NFR BLD AUTO: 0.2 % (ref 0–1)
BUN SERPL-MCNC: 17 MG/DL (ref 7–24)
CHLORIDE SERPL-SCNC: 101 MMOL/L (ref 98–107)
CK SERPL-CCNC: 35 U/L (ref 26–192)
CREAT SERPL-MCNC: 0.71 MG/DL (ref 0.55–1.02)
EOSINOPHIL # BLD AUTO: 0 10*3/UL (ref 0–0.4)
EOSINOPHIL # BLD AUTO: 0.1 % (ref 1–4)
ERYTHROCYTE [DISTWIDTH] IN BLOOD BY AUTOMATED COUNT: 14.6 % (ref 0–14.5)
HCO3 BLDA-SCNC: 33 MMOL/L (ref 22–26)
HCT VFR BLD AUTO: 43 % (ref 37–47)
LDH SERPL-CCNC: 146 U/L (ref 84–246)
LYMPHOCYTES # BLD AUTO: 2.9 10*3/UL (ref 1.3–4.4)
LYMPHOCYTES NFR BLD AUTO: 25.1 % (ref 27–41)
MCH RBC QN AUTO: 28.7 PG (ref 27–31)
MCHC RBC AUTO-ENTMCNC: 31.6 G/DL (ref 33–37)
MCV RBC AUTO: 90.7 FL (ref 81–99)
MONOCYTES # BLD AUTO: 0.7 10*3/UL (ref 0.1–1)
MONOCYTES NFR BLD MANUAL: 5.7 % (ref 3–9)
NEUT #: 8 10*3/UL (ref 2.3–7.9)
NEUT %: 68.5 % (ref 47–73)
NRBC BLD QL AUTO: 0 10*3/UL (ref 0–0)
PCO2 BLDA: 57 MMHG (ref 35–45)
PH BLDA: 7.38 [PH] (ref 7.35–7.45)
PLATELET # BLD AUTO: 285 10*3/UL (ref 130–400)
PMV BLD AUTO: 9.1 FL (ref 9.6–12.3)
PO2 BLDA: 71 MMHG (ref 80–90)
POTASSIUM SERPL-SCNC: 4.1 MMOL/L (ref 3.5–5.1)
PROT SERPL-MCNC: 7.1 GM/DL (ref 6.4–8.2)
RBC # BLD AUTO: 4.74 10*6/UL (ref 4.1–5.1)
SAO2 % BLDA: 95 % (ref 95–97)
SODIUM SERPL-SCNC: 138 MMOL/L (ref 136–145)
SPECIMEN PREPARATION: (no result)
WBC NRBC COR # BLD AUTO: 11.6 10*3/UL (ref 4.8–10.8)

## 2020-09-26 NOTE — NUR
dOZING, WAITING FOR LUNCH . d-sAT 86%. o2 INCREASED TO 10 l. Continued to
De-Sat 85-88% . O2 increased to 12L  Sat increased to 92% w/i 3 min.

## 2020-09-26 NOTE — NUR
PT RESTING IN BED AWAKE, A&O, PLEASANT AND COOPERATIVE. RESP NONLABORED. NO
ACUTE DISTRESS NOTED. BIPAP ON. HEPLOCK PATENT. NO S/S OF HYPO/HYPERGLYDEMIA
NOTED. NO COMPLAINTS VOICED.

## 2020-09-26 NOTE — NUR
Awake and alert. Independent. O2 decreased to 10 L and subsequently decreased
to 8L per RT. ABG was drawn and resulted. improved from 9/25 . Awaiting Dr. Marshall arrival for reporting of result.

## 2020-09-26 NOTE — NUR
Bi-pap  placed after lunch, remains. April ( infectious disease ) in to
evaulate. Reaffirmed recommendation to remain in isolation.

## 2020-09-26 NOTE — NUR
REMAINS SLEEPING WITHOUT DISTRESS. NO C/O'S VOICED. ISOLATION CONT. MONITOR
REMAINS SB IN THE 40'S, BUT HR COMES UP IN THE 60-70'S WHEN PT IS FULLY AWAKE.
CONDITION GUARDED.

## 2020-09-26 NOTE — NUR
09/25/20 2300 UP TO BSC. TOLERATED WELL. BIPAP REMOVED PER REQUEST AND PT
PLACED BACK ON HI DORETHA NC.
0005 RESTING IN BED WITH EYES CLOSED. APPEARS TO BE SLEEPING.

## 2020-09-26 NOTE — NUR
HR IN THE 40'S. PT IS SLEEPING SOUNDLY. SKIN W/D. /80. AWAKENS EASILY.
HR UP TO THE 60-70'S WHEN AWAKENED. WILL CONT TO MONITOR.

## 2020-09-27 VITALS — DIASTOLIC BLOOD PRESSURE: 63 MMHG

## 2020-09-27 VITALS — SYSTOLIC BLOOD PRESSURE: 149 MMHG | DIASTOLIC BLOOD PRESSURE: 57 MMHG

## 2020-09-27 VITALS — DIASTOLIC BLOOD PRESSURE: 57 MMHG

## 2020-09-27 VITALS — DIASTOLIC BLOOD PRESSURE: 50 MMHG | SYSTOLIC BLOOD PRESSURE: 116 MMHG

## 2020-09-27 VITALS — DIASTOLIC BLOOD PRESSURE: 65 MMHG | SYSTOLIC BLOOD PRESSURE: 127 MMHG

## 2020-09-27 VITALS — DIASTOLIC BLOOD PRESSURE: 64 MMHG | SYSTOLIC BLOOD PRESSURE: 147 MMHG

## 2020-09-27 LAB
ALBUMIN SERPL-MCNC: 3 GM/DL (ref 3.1–4.5)
ALP SERPL-CCNC: 84 U/L (ref 45–117)
ALT SERPL W P-5'-P-CCNC: 38 U/L (ref 12–78)
AST SERPL-CCNC: 10 IU/L (ref 3–35)
BASOPHILS # BLD AUTO: 0 10*3/UL (ref 0–0.1)
BASOPHILS NFR BLD AUTO: 0.2 % (ref 0–1)
BUN SERPL-MCNC: 18 MG/DL (ref 7–24)
CHLORIDE SERPL-SCNC: 100 MMOL/L (ref 98–107)
CK SERPL-CCNC: 36 U/L (ref 26–192)
CREAT SERPL-MCNC: 0.67 MG/DL (ref 0.55–1.02)
EOSINOPHIL # BLD AUTO: 0 % (ref 1–4)
EOSINOPHIL # BLD AUTO: 0 10*3/UL (ref 0–0.4)
ERYTHROCYTE [DISTWIDTH] IN BLOOD BY AUTOMATED COUNT: 14.6 % (ref 0–14.5)
HCT VFR BLD AUTO: 44 % (ref 37–47)
LDH SERPL-CCNC: 200 U/L (ref 84–246)
LYMPHOCYTES # BLD AUTO: 3 10*3/UL (ref 1.3–4.4)
LYMPHOCYTES NFR BLD AUTO: 26.2 % (ref 27–41)
MCH RBC QN AUTO: 28.5 PG (ref 27–31)
MCHC RBC AUTO-ENTMCNC: 31.6 G/DL (ref 33–37)
MCV RBC AUTO: 90.2 FL (ref 81–99)
MONOCYTES # BLD AUTO: 0.6 10*3/UL (ref 0.1–1)
MONOCYTES NFR BLD MANUAL: 5.6 % (ref 3–9)
NEUT #: 7.6 10*3/UL (ref 2.3–7.9)
NEUT %: 67.5 % (ref 47–73)
NRBC BLD QL AUTO: 0 % (ref 0–0)
PLATELET # BLD AUTO: 286 10*3/UL (ref 130–400)
PMV BLD AUTO: 9 FL (ref 9.6–12.3)
POTASSIUM SERPL-SCNC: 4 MMOL/L (ref 3.5–5.1)
PROT SERPL-MCNC: 7.2 GM/DL (ref 6.4–8.2)
RBC # BLD AUTO: 4.88 10*6/UL (ref 4.1–5.1)
SODIUM SERPL-SCNC: 135 MMOL/L (ref 136–145)
WBC NRBC COR # BLD AUTO: 11.3 10*3/UL (ref 4.8–10.8)

## 2020-09-27 NOTE — NUR
PT C/O HEADACHE FROM BIPAP. PT REQUEST TO WEAR NASAL CANNULA AT THIS TIME. HAS
WORN BIPAP THROUGHOUT NIGHT. MEDICATED WITH TYLENOL PER PRN ORDER FOR
C/OHEADACHE AND PLACED ON HI FLOW NC 12L. POX 94%. NO ACUTE DISTRESS NOTED.

## 2020-09-27 NOTE — NUR
PT. COMPLAINTS OF IV ANTIBIOTIC BURNING.  IV SITE IN RH CHECKED, NO LEAKING OR
SWELLING AT SIGHT, BLOOD RETURN NOTED.  NEW IV STARTED IN RIGHT ARM, PT.
STATES NO BURNING NOTED AT NEW SITE.  BOTH IV'S REMAIN INTACT.  ZITHROMAX
CURRENTLY RUNNING OVER 2 HRS AS RATE WAS ADJUSTED TO ACCOMMODATE BURNING
INITIALLY.
VIRI RUBI RN

## 2020-09-27 NOTE — NUR
PT. UP IN CHAIR LOOKING OUT OF WINDOW.  HEPLOCK IN RH ASYMPT. LUNGS DIMINISHED
BILAT, PULSE OX 92% ON 8L NC. ABDOMEN SOFTLY DISTENDED AND NORMO. EDEMA VS
OBESITY.  PT. STATES SOB WITH ACTIVITY, NONE NOTED AT REST IN CHAIR.  RESP.
EASY AND REG AT TIME OF ASSESSMENT.
VIRI RUBI RN

## 2020-09-28 VITALS — SYSTOLIC BLOOD PRESSURE: 134 MMHG | DIASTOLIC BLOOD PRESSURE: 65 MMHG

## 2020-09-28 VITALS — DIASTOLIC BLOOD PRESSURE: 79 MMHG

## 2020-09-28 VITALS — DIASTOLIC BLOOD PRESSURE: 70 MMHG

## 2020-09-28 VITALS — SYSTOLIC BLOOD PRESSURE: 139 MMHG | DIASTOLIC BLOOD PRESSURE: 61 MMHG

## 2020-09-28 VITALS — DIASTOLIC BLOOD PRESSURE: 53 MMHG | SYSTOLIC BLOOD PRESSURE: 133 MMHG

## 2020-09-28 VITALS — SYSTOLIC BLOOD PRESSURE: 136 MMHG | DIASTOLIC BLOOD PRESSURE: 62 MMHG

## 2020-09-28 LAB
ALBUMIN SERPL-MCNC: 3.1 GM/DL (ref 3.1–4.5)
ALP SERPL-CCNC: 93 U/L (ref 45–117)
ALT SERPL W P-5'-P-CCNC: 39 U/L (ref 12–78)
AST SERPL-CCNC: < 3 IU/L (ref 3–35)
BASOPHILS # BLD AUTO: 0 10*3/UL (ref 0–0.1)
BASOPHILS NFR BLD AUTO: 0.2 % (ref 0–1)
BUN SERPL-MCNC: 17 MG/DL (ref 7–24)
CHLORIDE SERPL-SCNC: 99 MMOL/L (ref 98–107)
CREAT SERPL-MCNC: 0.73 MG/DL (ref 0.55–1.02)
EOSINOPHIL # BLD AUTO: 0 10*3/UL (ref 0–0.4)
EOSINOPHIL # BLD AUTO: 0.1 % (ref 1–4)
ERYTHROCYTE [DISTWIDTH] IN BLOOD BY AUTOMATED COUNT: 14.6 % (ref 0–14.5)
HCT VFR BLD AUTO: 47.3 % (ref 37–47)
LYMPHOCYTES # BLD AUTO: 2.8 10*3/UL (ref 1.3–4.4)
LYMPHOCYTES NFR BLD AUTO: 22.6 % (ref 27–41)
MCH RBC QN AUTO: 28.4 PG (ref 27–31)
MCHC RBC AUTO-ENTMCNC: 31.7 G/DL (ref 33–37)
MCV RBC AUTO: 89.6 FL (ref 81–99)
MONOCYTES # BLD AUTO: 0.7 10*3/UL (ref 0.1–1)
MONOCYTES NFR BLD MANUAL: 5.4 % (ref 3–9)
NEUT #: 8.9 10*3/UL (ref 2.3–7.9)
NEUT %: 71.2 % (ref 47–73)
NRBC BLD QL AUTO: 0 % (ref 0–0)
PLATELET # BLD AUTO: 305 10*3/UL (ref 130–400)
PMV BLD AUTO: 8.9 FL (ref 9.6–12.3)
POTASSIUM SERPL-SCNC: 3.8 MMOL/L (ref 3.5–5.1)
PROT SERPL-MCNC: 7.5 GM/DL (ref 6.4–8.2)
RBC # BLD AUTO: 5.28 10*6/UL (ref 4.1–5.1)
SODIUM SERPL-SCNC: 137 MMOL/L (ref 136–145)
WBC NRBC COR # BLD AUTO: 12.5 10*3/UL (ref 4.8–10.8)

## 2020-09-28 NOTE — NUR
PT. RESTING IN BED WATCHING TV.  HEP LOCK IN RH ASYMPT. LUNGS DIMINISHED
BILAT, PULSE OX 92% ON 6L HFNC.  ABDOMEN SOFT ,NONDISTENDED AND NORMO. NO
PERIPHERAL EDEMA NOTED.  IV IN RIGHT ARM INFILTRATED, DISCONTINUED AT THIS
TIME. RESP. EASY AND REG, NO DISTRESS.  SOB INCREASING WITH EXERTION.
VIRI RUBI RN

## 2020-09-28 NOTE — NUR
alert and oriented x 3, cooperative
ambulated around room with a steady gait
still requiring high oxygen amounts

## 2020-09-28 NOTE — NUR
PT RETESTED WITH COVID SWABS X 2, USING THE SWABS THAT LAB HAD SENT FOR EACH
SPECIFIC TEST (RAPID AND SENT OUT)
 
NOW LAB HAD CALLED AND INFORMED US THAT THEY SENT THE WRONG SWAB, PT UNWILLING
TO BE RESWABBED AT THIS TIME

## 2020-09-29 VITALS — DIASTOLIC BLOOD PRESSURE: 42 MMHG

## 2020-09-29 VITALS — DIASTOLIC BLOOD PRESSURE: 66 MMHG

## 2020-09-29 VITALS — SYSTOLIC BLOOD PRESSURE: 111 MMHG | DIASTOLIC BLOOD PRESSURE: 62 MMHG

## 2020-09-29 VITALS — DIASTOLIC BLOOD PRESSURE: 54 MMHG | SYSTOLIC BLOOD PRESSURE: 107 MMHG

## 2020-09-29 LAB
ALBUMIN SERPL-MCNC: 3 GM/DL (ref 3.1–4.5)
ALP SERPL-CCNC: 87 U/L (ref 45–117)
ALT SERPL W P-5'-P-CCNC: 28 U/L (ref 12–78)
AST SERPL-CCNC: 6 IU/L (ref 3–35)
BUN SERPL-MCNC: 27 MG/DL (ref 7–24)
CHLORIDE SERPL-SCNC: 100 MMOL/L (ref 98–107)
CREAT SERPL-MCNC: 0.78 MG/DL (ref 0.55–1.02)
ERYTHROCYTE [DISTWIDTH] IN BLOOD BY AUTOMATED COUNT: 14.6 % (ref 0–14.5)
HCT VFR BLD AUTO: 48.1 % (ref 37–47)
MCH RBC QN AUTO: 28.7 PG (ref 27–31)
MCHC RBC AUTO-ENTMCNC: 32 G/DL (ref 33–37)
MCV RBC AUTO: 89.7 FL (ref 81–99)
NRBC BLD QL AUTO: 0 10*3/UL (ref 0–0)
PLATELET # BLD AUTO: 305 10*3/UL (ref 130–400)
PLATELET SUFFICIENCY: NORMAL
PMV BLD AUTO: 8.8 FL (ref 9.6–12.3)
POTASSIUM SERPL-SCNC: 3.3 MMOL/L (ref 3.5–5.1)
PROT SERPL-MCNC: 7.1 GM/DL (ref 6.4–8.2)
RBC # BLD AUTO: 5.36 10*6/UL (ref 4.1–5.1)
ROULEAUX BLD QL SMEAR: SLIGHT
SODIUM SERPL-SCNC: 136 MMOL/L (ref 136–145)
STOMATOCYTES BLD QL SMEAR: (no result)
TOTAL CELLS COUNTED: 100 #CELLS
WBC NRBC COR # BLD AUTO: 12.6 10*3/UL (ref 4.8–10.8)

## 2020-09-29 NOTE — NUR
PATIENT REMAINS IN THE CHAIR AND IS ANXIOUS TO GO HOME.. REQUESTED TO WAIT ON
GETTING CLEANED UP UNTIL SHE HEARS IF SHE IS GETTING TO GO HOME FOR SURE.

## 2020-09-29 NOTE — NUR
Faxed discharge summary to [facility] and spoke to Devorah at [facility] to notify
of room change.  is currently on his way. Nurse notified.

## 2020-09-29 NOTE — NUR
MOVED AFTER SEEN BY DR ALLEN - AWAITING HOME O2 TO BE SET UP THEN
DISCHARGED..REPORT TO MARIA ESTHER DOSHI RN

## 2020-09-29 NOTE — NUR
Discharge instructions reviewed with patient/family. Patient receptive and
verbalizes understanding. Follow-up care arranged. Written instructions given
to patient/family.
JAZMINE DOSHI

## 2020-09-29 NOTE — NUR
Spoke to Rupa in respiratory. Patient qualified for home O2 @ 4L nc. Dr. Strong notified of the need for an O2 prescription.

## 2020-09-29 NOTE — NUR
Received call from Devorah at [facility]. Faxed H&P and progress notes to
[facility]. Informed patient will need an O2 tank to go home with and patient is
in ICCU 2. Awaiting O2 tank to be delivered.

## 2020-09-29 NOTE — NUR
Received new O2 prescription from Dr. Jacob for O2 continuous 4L NC 24
hours per day. Spoke to Ariana at [facility] regarding new O2 referral. They do
take [employer] insurance. Faxed referral. Awaiting response.

## 2020-09-29 NOTE — NUR
HOME O2 ASSESSMENT:  PRE BP:  107/54, HR 98, RR 18, PULSE OX 86% ON ROOM AIR
AT REST.  PLACED 2 L/M ON PATIENT, SAT >88% AT REST- ON 3 L/M SAT >89% AT
REST- ON 4 L/M SAT >91% AT REST.
AMBULATED PATIENT IN ROOM, PULSE OX 90%-91% ON 4 L/M WHILE AMBULATING.
POST BP:  143/70, HR 96, RR 20
RN AND CASE MANAGEMENT NOTIFIED.

## 2021-01-16 ENCOUNTER — HOSPITAL ENCOUNTER (EMERGENCY)
Dept: HOSPITAL 83 - ED | Age: 41
Discharge: LEFT BEFORE DISCHARGE | End: 2021-01-16
Payer: COMMERCIAL

## 2021-01-16 VITALS — HEIGHT: 67.99 IN | BODY MASS INDEX: 40.92 KG/M2 | WEIGHT: 270 LBS

## 2021-01-16 VITALS — DIASTOLIC BLOOD PRESSURE: 53 MMHG | SYSTOLIC BLOOD PRESSURE: 135 MMHG

## 2021-01-16 VITALS — SYSTOLIC BLOOD PRESSURE: 147 MMHG | DIASTOLIC BLOOD PRESSURE: 74 MMHG

## 2021-01-16 VITALS — DIASTOLIC BLOOD PRESSURE: 47 MMHG

## 2021-01-16 VITALS — DIASTOLIC BLOOD PRESSURE: 40 MMHG | SYSTOLIC BLOOD PRESSURE: 129 MMHG

## 2021-01-16 VITALS — DIASTOLIC BLOOD PRESSURE: 63 MMHG | SYSTOLIC BLOOD PRESSURE: 137 MMHG

## 2021-01-16 VITALS — DIASTOLIC BLOOD PRESSURE: 68 MMHG

## 2021-01-16 VITALS — DIASTOLIC BLOOD PRESSURE: 57 MMHG

## 2021-01-16 VITALS — SYSTOLIC BLOOD PRESSURE: 105 MMHG | DIASTOLIC BLOOD PRESSURE: 69 MMHG

## 2021-01-16 VITALS — DIASTOLIC BLOOD PRESSURE: 72 MMHG

## 2021-01-16 VITALS — DIASTOLIC BLOOD PRESSURE: 68 MMHG | SYSTOLIC BLOOD PRESSURE: 108 MMHG

## 2021-01-16 VITALS — DIASTOLIC BLOOD PRESSURE: 50 MMHG | SYSTOLIC BLOOD PRESSURE: 86 MMHG

## 2021-01-16 VITALS — DIASTOLIC BLOOD PRESSURE: 64 MMHG

## 2021-01-16 VITALS — DIASTOLIC BLOOD PRESSURE: 73 MMHG

## 2021-01-16 VITALS — DIASTOLIC BLOOD PRESSURE: 74 MMHG

## 2021-01-16 VITALS — DIASTOLIC BLOOD PRESSURE: 70 MMHG

## 2021-01-16 DIAGNOSIS — J96.01: ICD-10-CM

## 2021-01-16 DIAGNOSIS — J96.02: Primary | ICD-10-CM

## 2021-01-16 DIAGNOSIS — R79.1: ICD-10-CM

## 2021-01-16 DIAGNOSIS — Z20.828: ICD-10-CM

## 2021-01-16 LAB
ALBUMIN SERPL-MCNC: 3.3 GM/DL (ref 3.1–4.5)
ALP SERPL-CCNC: 101 U/L (ref 45–117)
ALT SERPL W P-5'-P-CCNC: 29 U/L (ref 12–78)
APTT PPP: 32.4 SECONDS (ref 20–32.1)
AST SERPL-CCNC: 13 IU/L (ref 3–35)
BASE EXCESS BLDA CALC-SCNC: -1.2 MMOL/L (ref -2–2)
BASOPHILS # BLD AUTO: 0 10*3/UL (ref 0–0.1)
BASOPHILS NFR BLD AUTO: 0.2 % (ref 0–1)
BUN SERPL-MCNC: 15 MG/DL (ref 7–24)
CHLORIDE SERPL-SCNC: 105 MMOL/L (ref 98–107)
CREAT SERPL-MCNC: 1.05 MG/DL (ref 0.55–1.02)
EOSINOPHIL # BLD AUTO: 0.1 10*3/UL (ref 0–0.4)
EOSINOPHIL # BLD AUTO: 1.2 % (ref 1–4)
ERYTHROCYTE [DISTWIDTH] IN BLOOD BY AUTOMATED COUNT: 14 % (ref 0–14.5)
HCT VFR BLD AUTO: 51.3 % (ref 37–47)
INR BLD: 1.2 (ref 2–3.5)
LIPASE SERPL-CCNC: 30 U/L (ref 73–393)
LYMPHOCYTES # BLD AUTO: 2.8 10*3/UL (ref 1.3–4.4)
LYMPHOCYTES NFR BLD AUTO: 32.4 % (ref 27–41)
MCH RBC QN AUTO: 29 PG (ref 27–31)
MCHC RBC AUTO-ENTMCNC: 32.9 G/DL (ref 33–37)
MCV RBC AUTO: 88.1 FL (ref 81–99)
MONOCYTES # BLD AUTO: 1 10*3/UL (ref 0.1–1)
MONOCYTES NFR BLD MANUAL: 11.4 % (ref 3–9)
NEUT #: 4.7 10*3/UL (ref 2.3–7.9)
NEUT %: 54.6 % (ref 47–73)
NRBC BLD QL AUTO: 0 10*3/UL (ref 0–0)
PCO2 BLDA: 49 MMHG (ref 35–45)
PH BLDA: 7.33 [PH] (ref 7.35–7.45)
PLATELET # BLD AUTO: 390 10*3/UL (ref 130–400)
PMV BLD AUTO: 8.7 FL (ref 9.6–12.3)
PO2 BLDA: 169 MMHG (ref 80–90)
POTASSIUM SERPL-SCNC: 3.4 MMOL/L (ref 3.5–5.1)
PROT SERPL-MCNC: 7.6 GM/DL (ref 6.4–8.2)
RBC # BLD AUTO: 5.82 10*6/UL (ref 4.1–5.1)
SAO2 % BLDA: 99 % (ref 95–97)
SODIUM SERPL-SCNC: 137 MMOL/L (ref 136–145)
TROPONIN I SERPL-MCNC: < 0.015 NG/ML (ref ?–0.04)
WBC NRBC COR # BLD AUTO: 8.6 10*3/UL (ref 4.8–10.8)

## 2021-02-27 ENCOUNTER — HOSPITAL ENCOUNTER (EMERGENCY)
Dept: HOSPITAL 83 - ED | Age: 41
LOS: 1 days | Discharge: HOME | End: 2021-02-28
Payer: MEDICAID

## 2021-02-27 VITALS — WEIGHT: 270 LBS | HEIGHT: 67.99 IN | BODY MASS INDEX: 40.92 KG/M2

## 2021-02-27 VITALS — DIASTOLIC BLOOD PRESSURE: 80 MMHG | SYSTOLIC BLOOD PRESSURE: 132 MMHG

## 2021-02-27 DIAGNOSIS — Z79.899: ICD-10-CM

## 2021-02-27 DIAGNOSIS — Z88.8: ICD-10-CM

## 2021-02-27 DIAGNOSIS — Z88.2: ICD-10-CM

## 2021-02-27 DIAGNOSIS — R59.1: ICD-10-CM

## 2021-02-27 DIAGNOSIS — Z98.51: ICD-10-CM

## 2021-02-27 DIAGNOSIS — Z87.891: ICD-10-CM

## 2021-02-27 DIAGNOSIS — Z79.4: ICD-10-CM

## 2021-02-27 DIAGNOSIS — Z90.711: ICD-10-CM

## 2021-02-27 DIAGNOSIS — D35.02: Primary | ICD-10-CM

## 2021-02-27 LAB
ALBUMIN SERPL-MCNC: 3.4 GM/DL (ref 3.1–4.5)
ALP SERPL-CCNC: 106 U/L (ref 45–117)
ALT SERPL W P-5'-P-CCNC: 29 U/L (ref 12–78)
AST SERPL-CCNC: 8 IU/L (ref 3–35)
BUN SERPL-MCNC: 10 MG/DL (ref 7–24)
CHLORIDE SERPL-SCNC: 103 MMOL/L (ref 98–107)
CREAT SERPL-MCNC: 0.93 MG/DL (ref 0.55–1.02)
EPI CELLS #/AREA URNS HPF: (no result) /[HPF]
ERYTHROCYTE [DISTWIDTH] IN BLOOD BY AUTOMATED COUNT: 14.3 % (ref 0–14.5)
HCT VFR BLD AUTO: 45.8 % (ref 37–47)
LIPASE SERPL-CCNC: 31 U/L (ref 73–393)
MCH RBC QN AUTO: 29.5 PG (ref 27–31)
MCHC RBC AUTO-ENTMCNC: 32.8 G/DL (ref 33–37)
MCV RBC AUTO: 90 FL (ref 81–99)
NRBC BLD QL AUTO: 0 % (ref 0–0)
PH UR STRIP: 6 [PH] (ref 4.5–8)
PLATELET # BLD AUTO: 347 10*3/UL (ref 130–400)
PLATELET SUFFICIENCY: NORMAL
PMV BLD AUTO: 8.7 FL (ref 9.6–12.3)
POTASSIUM SERPL-SCNC: 3.3 MMOL/L (ref 3.5–5.1)
PROT SERPL-MCNC: 8 GM/DL (ref 6.4–8.2)
RBC # BLD AUTO: 5.09 10*6/UL (ref 4.1–5.1)
RBC #/AREA URNS HPF: (no result) RBC/HPF (ref 0–2)
RBC MORPH BLD: NORMAL
SODIUM SERPL-SCNC: 139 MMOL/L (ref 136–145)
SP GR UR: 1.01 (ref 1–1.03)
TOTAL CELLS COUNTED: 100 #CELLS
UROBILINOGEN UR STRIP-MCNC: 0.2 E.U./DL (ref 0–1)
WBC #/AREA URNS HPF: (no result) WBC/HPF (ref 0–5)
WBC NRBC COR # BLD AUTO: 15.7 10*3/UL (ref 4.8–10.8)

## 2021-07-07 ENCOUNTER — HOSPITAL ENCOUNTER (OUTPATIENT)
Dept: HOSPITAL 83 - MAMMO | Age: 41
Discharge: HOME | End: 2021-07-07
Payer: COMMERCIAL

## 2021-07-07 DIAGNOSIS — N64.4: Primary | ICD-10-CM

## 2021-10-31 ENCOUNTER — HOSPITAL ENCOUNTER (EMERGENCY)
Dept: HOSPITAL 83 - ED | Age: 41
Discharge: HOME | End: 2021-10-31
Payer: COMMERCIAL

## 2021-10-31 VITALS — DIASTOLIC BLOOD PRESSURE: 76 MMHG

## 2021-10-31 VITALS — WEIGHT: 260 LBS | HEIGHT: 67.99 IN | BODY MASS INDEX: 39.4 KG/M2

## 2021-10-31 DIAGNOSIS — Z88.2: ICD-10-CM

## 2021-10-31 DIAGNOSIS — N39.0: Primary | ICD-10-CM

## 2021-10-31 DIAGNOSIS — F17.210: ICD-10-CM

## 2021-10-31 DIAGNOSIS — Z88.8: ICD-10-CM

## 2021-10-31 DIAGNOSIS — Z79.899: ICD-10-CM

## 2021-10-31 LAB
BACTERIA #/AREA URNS HPF: (no result) /[HPF]
GLUCOSE UR QL: (no result)
PH UR STRIP: 5.5 [PH] (ref 4.5–8)
RBC #/AREA URNS HPF: (no result) RBC/HPF (ref 0–2)
SP GR UR: >= 1.03 (ref 1–1.03)
UROBILINOGEN UR STRIP-MCNC: 1 E.U./DL (ref 0–1)

## 2021-11-14 ENCOUNTER — HOSPITAL ENCOUNTER (EMERGENCY)
Dept: HOSPITAL 83 - ED | Age: 41
Discharge: HOME | End: 2021-11-14
Payer: COMMERCIAL

## 2021-11-14 VITALS — DIASTOLIC BLOOD PRESSURE: 51 MMHG | SYSTOLIC BLOOD PRESSURE: 133 MMHG

## 2021-11-14 VITALS — WEIGHT: 260 LBS | HEIGHT: 55 IN

## 2021-11-14 DIAGNOSIS — Z86.73: ICD-10-CM

## 2021-11-14 DIAGNOSIS — M79.605: ICD-10-CM

## 2021-11-14 DIAGNOSIS — F17.200: ICD-10-CM

## 2021-11-14 DIAGNOSIS — Z88.2: ICD-10-CM

## 2021-11-14 DIAGNOSIS — Z79.899: ICD-10-CM

## 2021-11-14 DIAGNOSIS — Z88.8: ICD-10-CM

## 2021-11-14 DIAGNOSIS — J44.9: ICD-10-CM

## 2021-11-14 DIAGNOSIS — E66.01: ICD-10-CM

## 2021-11-14 DIAGNOSIS — M79.604: Primary | ICD-10-CM

## 2021-11-14 LAB
ALBUMIN SERPL-MCNC: 3.3 GM/DL (ref 3.1–4.5)
ALP SERPL-CCNC: 110 U/L (ref 45–117)
ALT SERPL W P-5'-P-CCNC: 29 U/L (ref 12–78)
AST SERPL-CCNC: 22 IU/L (ref 3–35)
BASOPHILS # BLD AUTO: 0.1 10*3/UL (ref 0–0.1)
BASOPHILS NFR BLD AUTO: 0.6 % (ref 0–1)
BUN SERPL-MCNC: 12 MG/DL (ref 7–24)
CHLORIDE SERPL-SCNC: 101 MMOL/L (ref 98–107)
CREAT SERPL-MCNC: 0.93 MG/DL (ref 0.55–1.02)
EOSINOPHIL # BLD AUTO: 0.3 10*3/UL (ref 0–0.4)
EOSINOPHIL # BLD AUTO: 2.8 % (ref 1–4)
ERYTHROCYTE [DISTWIDTH] IN BLOOD BY AUTOMATED COUNT: 15.9 % (ref 0–14.5)
HCT VFR BLD AUTO: 46 % (ref 37–47)
LYMPHOCYTES # BLD AUTO: 3.2 10*3/UL (ref 1.3–4.4)
LYMPHOCYTES NFR BLD AUTO: 30.7 % (ref 27–41)
MCH RBC QN AUTO: 28.7 PG (ref 27–31)
MCHC RBC AUTO-ENTMCNC: 31.7 G/DL (ref 33–37)
MCV RBC AUTO: 90.4 FL (ref 81–99)
MONOCYTES # BLD AUTO: 0.6 10*3/UL (ref 0.1–1)
MONOCYTES NFR BLD MANUAL: 5.7 % (ref 3–9)
NEUT #: 6.2 10*3/UL (ref 2.3–7.9)
NEUT %: 59.6 % (ref 47–73)
NRBC BLD QL AUTO: 0 % (ref 0–0)
PLATELET # BLD AUTO: 378 10*3/UL (ref 130–400)
PMV BLD AUTO: 8.7 FL (ref 9.6–12.3)
POTASSIUM SERPL-SCNC: 4.1 MMOL/L (ref 3.5–5.1)
PROT SERPL-MCNC: 7.9 GM/DL (ref 6.4–8.2)
RBC # BLD AUTO: 5.09 10*6/UL (ref 4.1–5.1)
SODIUM SERPL-SCNC: 139 MMOL/L (ref 136–145)
WBC NRBC COR # BLD AUTO: 10.4 10*3/UL (ref 4.8–10.8)

## 2024-12-30 ENCOUNTER — HOSPITAL ENCOUNTER (INPATIENT)
Dept: HOSPITAL 83 - ED | Age: 44
LOS: 4 days | Discharge: HOME | DRG: 871 | End: 2025-01-03
Attending: INTERNAL MEDICINE | Admitting: INTERNAL MEDICINE
Payer: COMMERCIAL

## 2024-12-30 VITALS — BODY MASS INDEX: 39.33 KG/M2 | HEIGHT: 67.99 IN | WEIGHT: 259.5 LBS

## 2024-12-30 VITALS — DIASTOLIC BLOOD PRESSURE: 56 MMHG | SYSTOLIC BLOOD PRESSURE: 103 MMHG

## 2024-12-30 VITALS — SYSTOLIC BLOOD PRESSURE: 139 MMHG | DIASTOLIC BLOOD PRESSURE: 70 MMHG

## 2024-12-30 VITALS — SYSTOLIC BLOOD PRESSURE: 104 MMHG | DIASTOLIC BLOOD PRESSURE: 47 MMHG

## 2024-12-30 VITALS — DIASTOLIC BLOOD PRESSURE: 58 MMHG | SYSTOLIC BLOOD PRESSURE: 92 MMHG

## 2024-12-30 VITALS — DIASTOLIC BLOOD PRESSURE: 51 MMHG | SYSTOLIC BLOOD PRESSURE: 104 MMHG

## 2024-12-30 DIAGNOSIS — J44.1: ICD-10-CM

## 2024-12-30 DIAGNOSIS — Z98.51: ICD-10-CM

## 2024-12-30 DIAGNOSIS — E66.01: ICD-10-CM

## 2024-12-30 DIAGNOSIS — J44.0: ICD-10-CM

## 2024-12-30 DIAGNOSIS — Z79.4: ICD-10-CM

## 2024-12-30 DIAGNOSIS — Z79.51: ICD-10-CM

## 2024-12-30 DIAGNOSIS — Z80.0: ICD-10-CM

## 2024-12-30 DIAGNOSIS — J18.9: ICD-10-CM

## 2024-12-30 DIAGNOSIS — Z88.8: ICD-10-CM

## 2024-12-30 DIAGNOSIS — Z90.710: ICD-10-CM

## 2024-12-30 DIAGNOSIS — A41.9: Primary | ICD-10-CM

## 2024-12-30 DIAGNOSIS — E78.2: ICD-10-CM

## 2024-12-30 DIAGNOSIS — G45.9: ICD-10-CM

## 2024-12-30 DIAGNOSIS — E83.39: ICD-10-CM

## 2024-12-30 DIAGNOSIS — R33.9: ICD-10-CM

## 2024-12-30 DIAGNOSIS — E05.90: ICD-10-CM

## 2024-12-30 DIAGNOSIS — F17.200: ICD-10-CM

## 2024-12-30 DIAGNOSIS — D68.62: ICD-10-CM

## 2024-12-30 DIAGNOSIS — Z79.01: ICD-10-CM

## 2024-12-30 DIAGNOSIS — E11.65: ICD-10-CM

## 2024-12-30 DIAGNOSIS — R65.20: ICD-10-CM

## 2024-12-30 DIAGNOSIS — F33.9: ICD-10-CM

## 2024-12-30 DIAGNOSIS — G43.909: ICD-10-CM

## 2024-12-30 DIAGNOSIS — Z86.718: ICD-10-CM

## 2024-12-30 DIAGNOSIS — D84.9: ICD-10-CM

## 2024-12-30 DIAGNOSIS — F17.210: ICD-10-CM

## 2024-12-30 DIAGNOSIS — J98.4: ICD-10-CM

## 2024-12-30 DIAGNOSIS — G44.89: ICD-10-CM

## 2024-12-30 DIAGNOSIS — Z20.822: ICD-10-CM

## 2024-12-30 DIAGNOSIS — E87.1: ICD-10-CM

## 2024-12-30 DIAGNOSIS — E83.51: ICD-10-CM

## 2024-12-30 DIAGNOSIS — Z79.899: ICD-10-CM

## 2024-12-30 DIAGNOSIS — Z71.6: ICD-10-CM

## 2024-12-30 DIAGNOSIS — I10: ICD-10-CM

## 2024-12-30 DIAGNOSIS — Z88.2: ICD-10-CM

## 2024-12-30 DIAGNOSIS — Z79.2: ICD-10-CM

## 2024-12-30 DIAGNOSIS — J96.01: ICD-10-CM

## 2024-12-30 LAB
BASOPHILS # BLD AUTO: 0.1 10*3/UL (ref 0–0.1)
BASOPHILS NFR BLD AUTO: 0.3 % (ref 0–1)
BUN SERPL-MCNC: 13 MG/DL (ref 9–23)
CHLORIDE SERPL-SCNC: 101 MMOL/L (ref 98–107)
EOSINOPHIL # BLD AUTO: 0 10*3/UL (ref 0–0.4)
EOSINOPHIL # BLD AUTO: 0.2 % (ref 1–4)
EPI CELLS #/AREA URNS HPF: (no result) /[HPF]
ERYTHROCYTE [DISTWIDTH] IN BLOOD BY AUTOMATED COUNT: 13.2 % (ref 0–14.5)
GLUCOSE UR QL: (no result)
HCT VFR BLD AUTO: 45.2 % (ref 37–47)
MCH RBC QN AUTO: 29.6 PG (ref 27–31)
MCHC RBC AUTO-ENTMCNC: 33 G/DL (ref 33–37)
MCV RBC AUTO: 89.9 FL (ref 81–99)
MONOCYTES # BLD AUTO: 1.3 10*3/UL (ref 0.1–1)
MONOCYTES NFR BLD MANUAL: 7.1 % (ref 3–9)
NEUT #: 14.3 10*3/UL (ref 2.3–7.9)
NEUT %: 80.6 % (ref 47–73)
NRBC BLD QL AUTO: 0 % (ref 0–0)
PH UR STRIP: 5.5 [PH] (ref 4.5–8)
PLATELET # BLD AUTO: 222 10*3/UL (ref 130–400)
PMV BLD AUTO: 8.5 FL (ref 9.6–12.3)
POTASSIUM SERPL-SCNC: 3.5 MMOL/L (ref 3.4–5.1)
RBC # BLD AUTO: 5.03 10*6/UL (ref 4.1–5.1)
SP GR UR: >= 1.03 (ref 1–1.03)
UROBILINOGEN UR STRIP-MCNC: 1 E.U./DL (ref 0–1)
WBC #/AREA URNS HPF: (no result) WBC/HPF (ref 0–5)
WBC NRBC COR # BLD AUTO: 17.7 10*3/UL (ref 4.8–10.8)

## 2024-12-30 PROCEDURE — 5A0935A ASSISTANCE WITH RESPIRATORY VENTILATION, LESS THAN 24 CONSECUTIVE HOURS, HIGH NASAL FLOW/VELOCITY: ICD-10-PCS | Performed by: INTERNAL MEDICINE

## 2024-12-31 VITALS — SYSTOLIC BLOOD PRESSURE: 149 MMHG | DIASTOLIC BLOOD PRESSURE: 71 MMHG

## 2024-12-31 VITALS — DIASTOLIC BLOOD PRESSURE: 73 MMHG

## 2024-12-31 VITALS — SYSTOLIC BLOOD PRESSURE: 146 MMHG | DIASTOLIC BLOOD PRESSURE: 68 MMHG

## 2024-12-31 VITALS — SYSTOLIC BLOOD PRESSURE: 137 MMHG | DIASTOLIC BLOOD PRESSURE: 62 MMHG

## 2024-12-31 VITALS — DIASTOLIC BLOOD PRESSURE: 70 MMHG

## 2024-12-31 VITALS — DIASTOLIC BLOOD PRESSURE: 77 MMHG

## 2024-12-31 LAB
ALP SERPL-CCNC: 92 U/L (ref 46–116)
ALT SERPL W P-5'-P-CCNC: 11 U/L (ref 5–49)
APTT PPP: 33.5 SECONDS (ref 20–32.1)
BUN SERPL-MCNC: 9 MG/DL (ref 9–23)
CHLORIDE SERPL-SCNC: 106 MMOL/L (ref 98–107)
CHOLEST SERPL-MCNC: 150 MG/DL (ref ?–200)
ERYTHROCYTE [DISTWIDTH] IN BLOOD BY AUTOMATED COUNT: 13.2 % (ref 0–14.5)
HCT VFR BLD AUTO: 43.7 % (ref 37–47)
LDLC SERPL DIRECT ASSAY-MCNC: 96 MG/DL (ref 9–159)
MANUAL DIFFERENTIAL PERFORMED BLD QL: YES
MCH RBC QN AUTO: 29.4 PG (ref 27–31)
MCHC RBC AUTO-ENTMCNC: 32 G/DL (ref 33–37)
MCV RBC AUTO: 91.6 FL (ref 81–99)
NRBC BLD QL AUTO: 0 10*3/UL (ref 0–0)
PLATELET # BLD AUTO: 223 10*3/UL (ref 130–400)
PLATELET SUFFICIENCY: NORMAL
PMV BLD AUTO: 9.1 FL (ref 9.6–12.3)
POTASSIUM SERPL-SCNC: 4.2 MMOL/L (ref 3.4–5.1)
PROT SERPL-MCNC: 6.8 GM/DL (ref 6–8)
RBC # BLD AUTO: 4.77 10*6/UL (ref 4.1–5.1)
RBC MORPH BLD: NORMAL
TOTAL CELLS COUNTED: 100 #CELLS
TRIGL SERPL-MCNC: 91 MG/DL (ref ?–150)
WBC NRBC COR # BLD AUTO: 14 10*3/UL (ref 4.8–10.8)

## 2024-12-31 PROCEDURE — 5A0935A ASSISTANCE WITH RESPIRATORY VENTILATION, LESS THAN 24 CONSECUTIVE HOURS, HIGH NASAL FLOW/VELOCITY: ICD-10-PCS | Performed by: INTERNAL MEDICINE

## 2025-01-01 VITALS — SYSTOLIC BLOOD PRESSURE: 136 MMHG | DIASTOLIC BLOOD PRESSURE: 54 MMHG

## 2025-01-01 VITALS — SYSTOLIC BLOOD PRESSURE: 130 MMHG | DIASTOLIC BLOOD PRESSURE: 60 MMHG

## 2025-01-01 VITALS — DIASTOLIC BLOOD PRESSURE: 66 MMHG | SYSTOLIC BLOOD PRESSURE: 154 MMHG

## 2025-01-01 VITALS — DIASTOLIC BLOOD PRESSURE: 69 MMHG

## 2025-01-01 VITALS — DIASTOLIC BLOOD PRESSURE: 83 MMHG

## 2025-01-01 LAB
BUN SERPL-MCNC: 15 MG/DL (ref 9–23)
CHLORIDE SERPL-SCNC: 103 MMOL/L (ref 98–107)
POTASSIUM SERPL-SCNC: 4 MMOL/L (ref 3.4–5.1)
T4 FREE SERPL-MCNC: 1.04 NG/DL (ref 0.89–1.76)

## 2025-01-01 PROCEDURE — 5A0935A ASSISTANCE WITH RESPIRATORY VENTILATION, LESS THAN 24 CONSECUTIVE HOURS, HIGH NASAL FLOW/VELOCITY: ICD-10-PCS | Performed by: INTERNAL MEDICINE

## 2025-01-02 VITALS — DIASTOLIC BLOOD PRESSURE: 83 MMHG | SYSTOLIC BLOOD PRESSURE: 153 MMHG

## 2025-01-02 VITALS — DIASTOLIC BLOOD PRESSURE: 86 MMHG | SYSTOLIC BLOOD PRESSURE: 154 MMHG

## 2025-01-02 VITALS — SYSTOLIC BLOOD PRESSURE: 154 MMHG | DIASTOLIC BLOOD PRESSURE: 89 MMHG

## 2025-01-02 VITALS — DIASTOLIC BLOOD PRESSURE: 68 MMHG

## 2025-01-02 VITALS — DIASTOLIC BLOOD PRESSURE: 62 MMHG

## 2025-01-03 VITALS — DIASTOLIC BLOOD PRESSURE: 69 MMHG

## 2025-01-03 VITALS — DIASTOLIC BLOOD PRESSURE: 84 MMHG

## 2025-01-12 ENCOUNTER — HOSPITAL ENCOUNTER (INPATIENT)
Dept: HOSPITAL 83 - ED | Age: 45
LOS: 5 days | Discharge: HOME | DRG: 871 | End: 2025-01-17
Attending: INTERNAL MEDICINE | Admitting: INTERNAL MEDICINE
Payer: COMMERCIAL

## 2025-01-12 VITALS — WEIGHT: 244.19 LBS | HEIGHT: 68 IN | BODY MASS INDEX: 37.01 KG/M2

## 2025-01-12 VITALS — DIASTOLIC BLOOD PRESSURE: 57 MMHG | SYSTOLIC BLOOD PRESSURE: 128 MMHG

## 2025-01-12 DIAGNOSIS — R65.20: ICD-10-CM

## 2025-01-12 DIAGNOSIS — A41.9: Primary | ICD-10-CM

## 2025-01-12 DIAGNOSIS — Z80.8: ICD-10-CM

## 2025-01-12 DIAGNOSIS — Z88.2: ICD-10-CM

## 2025-01-12 DIAGNOSIS — J18.9: ICD-10-CM

## 2025-01-12 DIAGNOSIS — J84.10: ICD-10-CM

## 2025-01-12 DIAGNOSIS — F32.9: ICD-10-CM

## 2025-01-12 DIAGNOSIS — E05.90: ICD-10-CM

## 2025-01-12 DIAGNOSIS — E87.6: ICD-10-CM

## 2025-01-12 DIAGNOSIS — Z79.2: ICD-10-CM

## 2025-01-12 DIAGNOSIS — Z79.899: ICD-10-CM

## 2025-01-12 DIAGNOSIS — Z86.718: ICD-10-CM

## 2025-01-12 DIAGNOSIS — I12.9: ICD-10-CM

## 2025-01-12 DIAGNOSIS — Z88.8: ICD-10-CM

## 2025-01-12 DIAGNOSIS — J96.01: ICD-10-CM

## 2025-01-12 DIAGNOSIS — G43.909: ICD-10-CM

## 2025-01-12 DIAGNOSIS — F17.210: ICD-10-CM

## 2025-01-12 DIAGNOSIS — Z90.710: ICD-10-CM

## 2025-01-12 DIAGNOSIS — E87.1: ICD-10-CM

## 2025-01-12 DIAGNOSIS — E87.8: ICD-10-CM

## 2025-01-12 DIAGNOSIS — Z79.01: ICD-10-CM

## 2025-01-12 DIAGNOSIS — Z86.73: ICD-10-CM

## 2025-01-12 DIAGNOSIS — J44.1: ICD-10-CM

## 2025-01-12 DIAGNOSIS — Z82.49: ICD-10-CM

## 2025-01-12 DIAGNOSIS — E11.65: ICD-10-CM

## 2025-01-12 DIAGNOSIS — N18.31: ICD-10-CM

## 2025-01-12 DIAGNOSIS — Z91.09: ICD-10-CM

## 2025-01-12 DIAGNOSIS — E78.2: ICD-10-CM

## 2025-01-12 DIAGNOSIS — Z83.3: ICD-10-CM

## 2025-01-12 DIAGNOSIS — N17.9: ICD-10-CM

## 2025-01-12 DIAGNOSIS — E11.22: ICD-10-CM

## 2025-01-12 DIAGNOSIS — J43.9: ICD-10-CM

## 2025-01-12 DIAGNOSIS — J44.0: ICD-10-CM

## 2025-01-12 DIAGNOSIS — E66.01: ICD-10-CM

## 2025-01-12 LAB
BASOPHILS # BLD AUTO: 0.1 10*3/UL (ref 0–0.1)
BASOPHILS NFR BLD AUTO: 0.5 % (ref 0–1)
BUN SERPL-MCNC: 18 MG/DL (ref 9–23)
CHLORIDE SERPL-SCNC: 93 MMOL/L (ref 98–107)
EOSINOPHIL # BLD AUTO: 0 % (ref 1–4)
EOSINOPHIL # BLD AUTO: 0 10*3/UL (ref 0–0.4)
ERYTHROCYTE [DISTWIDTH] IN BLOOD BY AUTOMATED COUNT: 13.8 % (ref 0–14.5)
HCT VFR BLD AUTO: 44.4 % (ref 37–47)
MCH RBC QN AUTO: 28.9 PG (ref 27–31)
MCHC RBC AUTO-ENTMCNC: 33.1 G/DL (ref 33–37)
MCV RBC AUTO: 87.2 FL (ref 81–99)
MONOCYTES # BLD AUTO: 1 10*3/UL (ref 0.1–1)
MONOCYTES NFR BLD MANUAL: 9.3 % (ref 3–9)
NEUT #: 7.5 10*3/UL (ref 2.3–7.9)
NEUT %: 71.3 % (ref 47–73)
NRBC BLD QL AUTO: 0 % (ref 0–0)
PLATELET # BLD AUTO: 199 10*3/UL (ref 130–400)
PMV BLD AUTO: 8.6 FL (ref 9.6–12.3)
POTASSIUM SERPL-SCNC: 3.3 MMOL/L (ref 3.4–5.1)
RBC # BLD AUTO: 5.09 10*6/UL (ref 4.1–5.1)
WBC NRBC COR # BLD AUTO: 10.5 10*3/UL (ref 4.8–10.8)

## 2025-01-13 VITALS — DIASTOLIC BLOOD PRESSURE: 64 MMHG

## 2025-01-13 VITALS — DIASTOLIC BLOOD PRESSURE: 67 MMHG

## 2025-01-13 VITALS — SYSTOLIC BLOOD PRESSURE: 104 MMHG | DIASTOLIC BLOOD PRESSURE: 61 MMHG

## 2025-01-13 VITALS — DIASTOLIC BLOOD PRESSURE: 79 MMHG

## 2025-01-13 VITALS — SYSTOLIC BLOOD PRESSURE: 110 MMHG | DIASTOLIC BLOOD PRESSURE: 47 MMHG

## 2025-01-13 VITALS — DIASTOLIC BLOOD PRESSURE: 43 MMHG

## 2025-01-13 LAB
BASE EXCESS BLDA CALC-SCNC: 5 MMOL/L (ref -2–3)
CHLORIDE UR-SCNC: 148 MMOL/L
PH BLDA: 7.42 [PH] (ref 7.35–7.45)
PH UR STRIP: 5.5 [PH] (ref 4.5–8)
PO2 BLDA: 61.9 MMHG (ref 83–108)
SAO2 % BLDA: 93.3 % (ref 94–98)
SP GR UR: 1.02 (ref 1–1.03)
UROBILINOGEN UR STRIP-MCNC: 0.2 E.U./DL (ref 0–1)
WBC #/AREA URNS HPF: (no result) WBC/HPF (ref 0–5)

## 2025-01-14 VITALS — DIASTOLIC BLOOD PRESSURE: 79 MMHG | SYSTOLIC BLOOD PRESSURE: 127 MMHG

## 2025-01-14 VITALS — DIASTOLIC BLOOD PRESSURE: 56 MMHG

## 2025-01-14 VITALS — SYSTOLIC BLOOD PRESSURE: 120 MMHG | DIASTOLIC BLOOD PRESSURE: 58 MMHG

## 2025-01-14 VITALS — DIASTOLIC BLOOD PRESSURE: 48 MMHG

## 2025-01-14 VITALS — DIASTOLIC BLOOD PRESSURE: 57 MMHG

## 2025-01-14 LAB
BASOPHILS # BLD AUTO: 0 10*3/UL (ref 0–0.1)
BASOPHILS NFR BLD AUTO: 0.3 % (ref 0–1)
BUN SERPL-MCNC: 13 MG/DL (ref 9–23)
CHLORIDE SERPL-SCNC: 101 MMOL/L (ref 98–107)
EOSINOPHIL # BLD AUTO: 0.1 10*3/UL (ref 0–0.4)
EOSINOPHIL # BLD AUTO: 1.1 % (ref 1–4)
ERYTHROCYTE [DISTWIDTH] IN BLOOD BY AUTOMATED COUNT: 13.7 % (ref 0–14.5)
HCT VFR BLD AUTO: 41.9 % (ref 37–47)
MCH RBC QN AUTO: 29.4 PG (ref 27–31)
MCHC RBC AUTO-ENTMCNC: 32.9 G/DL (ref 33–37)
MCV RBC AUTO: 89.3 FL (ref 81–99)
MONOCYTES # BLD AUTO: 0.7 10*3/UL (ref 0.1–1)
MONOCYTES NFR BLD MANUAL: 10.4 % (ref 3–9)
NEUT #: 3.7 10*3/UL (ref 2.3–7.9)
NEUT %: 58.8 % (ref 47–73)
NRBC BLD QL AUTO: 0 10*3/UL (ref 0–0)
PLATELET # BLD AUTO: 179 10*3/UL (ref 130–400)
PMV BLD AUTO: 9 FL (ref 9.6–12.3)
POTASSIUM SERPL-SCNC: 3.7 MMOL/L (ref 3.4–5.1)
RBC # BLD AUTO: 4.69 10*6/UL (ref 4.1–5.1)
WBC NRBC COR # BLD AUTO: 6.3 10*3/UL (ref 4.8–10.8)

## 2025-01-15 VITALS — SYSTOLIC BLOOD PRESSURE: 113 MMHG | DIASTOLIC BLOOD PRESSURE: 59 MMHG

## 2025-01-15 VITALS — DIASTOLIC BLOOD PRESSURE: 45 MMHG | SYSTOLIC BLOOD PRESSURE: 112 MMHG

## 2025-01-15 VITALS — SYSTOLIC BLOOD PRESSURE: 126 MMHG | DIASTOLIC BLOOD PRESSURE: 52 MMHG

## 2025-01-15 VITALS — DIASTOLIC BLOOD PRESSURE: 51 MMHG | SYSTOLIC BLOOD PRESSURE: 107 MMHG

## 2025-01-15 VITALS — SYSTOLIC BLOOD PRESSURE: 141 MMHG | DIASTOLIC BLOOD PRESSURE: 66 MMHG

## 2025-01-15 LAB
BUN SERPL-MCNC: 13 MG/DL (ref 9–23)
CHLORIDE SERPL-SCNC: 100 MMOL/L (ref 98–107)
ERYTHROCYTE [DISTWIDTH] IN BLOOD BY AUTOMATED COUNT: 13.7 % (ref 0–14.5)
HCT VFR BLD AUTO: 39.7 % (ref 37–47)
MANUAL DIFFERENTIAL PERFORMED BLD QL: YES
MCH RBC QN AUTO: 29.1 PG (ref 27–31)
MCHC RBC AUTO-ENTMCNC: 32.5 G/DL (ref 33–37)
MCV RBC AUTO: 89.4 FL (ref 81–99)
NRBC BLD QL AUTO: 0 10*3/UL (ref 0–0)
PLATELET # BLD AUTO: 199 10*3/UL (ref 130–400)
PLATELET SUFFICIENCY: NORMAL
PMV BLD AUTO: 9 FL (ref 9.6–12.3)
POTASSIUM SERPL-SCNC: 4.1 MMOL/L (ref 3.4–5.1)
RBC # BLD AUTO: 4.44 10*6/UL (ref 4.1–5.1)
RBC MORPH BLD: NORMAL
TOTAL CELLS COUNTED: 100 #CELLS
VARIANT LYMPHS NFR BLD MANUAL: 6 % (ref 0–0)
WBC NRBC COR # BLD AUTO: 6.7 10*3/UL (ref 4.8–10.8)

## 2025-01-16 VITALS — SYSTOLIC BLOOD PRESSURE: 136 MMHG | DIASTOLIC BLOOD PRESSURE: 77 MMHG

## 2025-01-16 VITALS — SYSTOLIC BLOOD PRESSURE: 128 MMHG | DIASTOLIC BLOOD PRESSURE: 54 MMHG

## 2025-01-16 VITALS — SYSTOLIC BLOOD PRESSURE: 131 MMHG | DIASTOLIC BLOOD PRESSURE: 66 MMHG

## 2025-01-16 VITALS — DIASTOLIC BLOOD PRESSURE: 77 MMHG | SYSTOLIC BLOOD PRESSURE: 136 MMHG

## 2025-01-16 VITALS — DIASTOLIC BLOOD PRESSURE: 48 MMHG

## 2025-01-16 LAB
BUN SERPL-MCNC: 14 MG/DL (ref 9–23)
CHLORIDE SERPL-SCNC: 102 MMOL/L (ref 98–107)
ERYTHROCYTE [DISTWIDTH] IN BLOOD BY AUTOMATED COUNT: 13.5 % (ref 0–14.5)
HCT VFR BLD AUTO: 40.6 % (ref 37–47)
MANUAL DIFFERENTIAL PERFORMED BLD QL: YES
MCH RBC QN AUTO: 29.8 PG (ref 27–31)
MCHC RBC AUTO-ENTMCNC: 33.5 G/DL (ref 33–37)
MCV RBC AUTO: 89 FL (ref 81–99)
NRBC BLD QL AUTO: 0 % (ref 0–0)
PLATELET # BLD AUTO: 224 10*3/UL (ref 130–400)
PLATELET SUFFICIENCY: NORMAL
PMV BLD AUTO: 8.7 FL (ref 9.6–12.3)
POTASSIUM SERPL-SCNC: 3.6 MMOL/L (ref 3.4–5.1)
RBC # BLD AUTO: 4.56 10*6/UL (ref 4.1–5.1)
RBC MORPH BLD: NORMAL
TOTAL CELLS COUNTED: 100 #CELLS
VARIANT LYMPHS NFR BLD MANUAL: 6 % (ref 0–0)
WBC NRBC COR # BLD AUTO: 9.4 10*3/UL (ref 4.8–10.8)

## 2025-01-17 VITALS — SYSTOLIC BLOOD PRESSURE: 141 MMHG | DIASTOLIC BLOOD PRESSURE: 75 MMHG

## 2025-01-17 VITALS — DIASTOLIC BLOOD PRESSURE: 78 MMHG
